# Patient Record
Sex: MALE | Race: WHITE | URBAN - METROPOLITAN AREA
[De-identification: names, ages, dates, MRNs, and addresses within clinical notes are randomized per-mention and may not be internally consistent; named-entity substitution may affect disease eponyms.]

---

## 2017-05-17 ENCOUNTER — HOSPITAL ENCOUNTER (OUTPATIENT)
Facility: CLINIC | Age: 77
End: 2017-05-17
Attending: OPHTHALMOLOGY | Admitting: OPHTHALMOLOGY
Payer: MEDICARE

## 2017-07-07 RX ORDER — DICLOFENAC SODIUM 1 MG/ML
1 SOLUTION/ DROPS OPHTHALMIC
Status: CANCELLED | OUTPATIENT
Start: 2017-07-07

## 2017-07-07 RX ORDER — PHENYLEPHRINE HYDROCHLORIDE 25 MG/ML
1 SOLUTION/ DROPS OPHTHALMIC
Status: CANCELLED | OUTPATIENT
Start: 2017-07-07

## 2017-07-07 RX ORDER — MOXIFLOXACIN 5 MG/ML
1 SOLUTION/ DROPS OPHTHALMIC
Status: CANCELLED | OUTPATIENT
Start: 2017-07-07

## 2017-07-07 RX ORDER — TROPICAMIDE 10 MG/ML
1 SOLUTION/ DROPS OPHTHALMIC
Status: CANCELLED | OUTPATIENT
Start: 2017-07-07

## 2017-07-17 RX ORDER — METRONIDAZOLE 10 MG/G
GEL TOPICAL DAILY
COMMUNITY

## 2017-07-17 RX ORDER — DOXYCYCLINE 50 MG/1
CAPSULE ORAL
COMMUNITY

## 2017-07-17 RX ORDER — TRETINOIN 0.25 MG/G
CREAM TOPICAL AT BEDTIME
COMMUNITY

## 2017-07-17 RX ORDER — FLUOROURACIL 50 MG/G
CREAM TOPICAL
Status: ON HOLD | COMMUNITY
End: 2017-08-15

## 2017-07-18 ENCOUNTER — SURGERY (OUTPATIENT)
Age: 77
End: 2017-07-18

## 2017-07-18 ENCOUNTER — ANESTHESIA (OUTPATIENT)
Dept: SURGERY | Facility: CLINIC | Age: 77
End: 2017-07-18
Payer: MEDICARE

## 2017-07-18 ENCOUNTER — ANESTHESIA EVENT (OUTPATIENT)
Dept: SURGERY | Facility: CLINIC | Age: 77
End: 2017-07-18
Payer: MEDICARE

## 2017-07-18 ENCOUNTER — HOSPITAL ENCOUNTER (OUTPATIENT)
Facility: CLINIC | Age: 77
Discharge: HOME | End: 2017-07-18
Attending: OPHTHALMOLOGY | Admitting: OPHTHALMOLOGY
Payer: MEDICARE

## 2017-07-18 VITALS
OXYGEN SATURATION: 97 % | DIASTOLIC BLOOD PRESSURE: 73 MMHG | RESPIRATION RATE: 16 BRPM | WEIGHT: 199 LBS | HEIGHT: 71 IN | BODY MASS INDEX: 27.86 KG/M2 | SYSTOLIC BLOOD PRESSURE: 153 MMHG | TEMPERATURE: 96.9 F

## 2017-07-18 PROCEDURE — 40000170 ZZH STATISTIC PRE-PROCEDURE ASSESSMENT II: Performed by: OPHTHALMOLOGY

## 2017-07-18 PROCEDURE — 37000009 ZZH ANESTHESIA TECHNICAL FEE, EACH ADDTL 15 MIN: Performed by: OPHTHALMOLOGY

## 2017-07-18 PROCEDURE — 25000125 ZZHC RX 250: Performed by: OPHTHALMOLOGY

## 2017-07-18 PROCEDURE — 36000101 ZZH EYE SURGERY LEVEL 3 1ST 30 MIN: Performed by: OPHTHALMOLOGY

## 2017-07-18 PROCEDURE — 25000125 ZZHC RX 250: Performed by: ANESTHESIOLOGY

## 2017-07-18 PROCEDURE — 36000135 ZZH KERATOTOMY ARCUATE W FEMTOSECOND LASER/IMAGING FOR ATIOL: Performed by: OPHTHALMOLOGY

## 2017-07-18 PROCEDURE — V2788 PRESBYOPIA-CORRECT FUNCTION: HCPCS | Performed by: OPHTHALMOLOGY

## 2017-07-18 PROCEDURE — 27210794 ZZH OR GENERAL SUPPLY STERILE: Performed by: OPHTHALMOLOGY

## 2017-07-18 PROCEDURE — 25000128 H RX IP 250 OP 636: Performed by: NURSE ANESTHETIST, CERTIFIED REGISTERED

## 2017-07-18 PROCEDURE — 25000128 H RX IP 250 OP 636: Performed by: OPHTHALMOLOGY

## 2017-07-18 PROCEDURE — 37000008 ZZH ANESTHESIA TECHNICAL FEE, 1ST 30 MIN: Performed by: OPHTHALMOLOGY

## 2017-07-18 PROCEDURE — 71000028 ZZH EYE RECOVERY PHASE 2 EACH 15 MINS: Performed by: OPHTHALMOLOGY

## 2017-07-18 PROCEDURE — V2632 POST CHMBR INTRAOCULAR LENS: HCPCS | Performed by: OPHTHALMOLOGY

## 2017-07-18 PROCEDURE — 25000128 H RX IP 250 OP 636: Performed by: ANESTHESIOLOGY

## 2017-07-18 RX ORDER — BALANCED SALT SOLUTION 6.4; .75; .48; .3; 3.9; 1.7 MG/ML; MG/ML; MG/ML; MG/ML; MG/ML; MG/ML
SOLUTION OPHTHALMIC PRN
Status: DISCONTINUED | OUTPATIENT
Start: 2017-07-18 | End: 2017-07-18 | Stop reason: HOSPADM

## 2017-07-18 RX ORDER — TROPICAMIDE 10 MG/ML
1 SOLUTION/ DROPS OPHTHALMIC
Status: COMPLETED | OUTPATIENT
Start: 2017-07-18 | End: 2017-07-18

## 2017-07-18 RX ORDER — PHENYLEPHRINE HYDROCHLORIDE 25 MG/ML
1 SOLUTION/ DROPS OPHTHALMIC
Status: COMPLETED | OUTPATIENT
Start: 2017-07-18 | End: 2017-07-18

## 2017-07-18 RX ORDER — ONDANSETRON 2 MG/ML
INJECTION INTRAMUSCULAR; INTRAVENOUS PRN
Status: DISCONTINUED | OUTPATIENT
Start: 2017-07-18 | End: 2017-07-18

## 2017-07-18 RX ORDER — LIDOCAINE HYDROCHLORIDE 10 MG/ML
INJECTION, SOLUTION EPIDURAL; INFILTRATION; INTRACAUDAL; PERINEURAL PRN
Status: DISCONTINUED | OUTPATIENT
Start: 2017-07-18 | End: 2017-07-18 | Stop reason: HOSPADM

## 2017-07-18 RX ORDER — MOXIFLOXACIN 5 MG/ML
1 SOLUTION/ DROPS OPHTHALMIC
Status: COMPLETED | OUTPATIENT
Start: 2017-07-18 | End: 2017-07-18

## 2017-07-18 RX ORDER — DICLOFENAC SODIUM 1 MG/ML
1 SOLUTION/ DROPS OPHTHALMIC
Status: COMPLETED | OUTPATIENT
Start: 2017-07-18 | End: 2017-07-18

## 2017-07-18 RX ORDER — PREDNISOLONE ACETATE 1 %
SUSPENSION, DROPS(FINAL DOSAGE FORM)(ML) OPHTHALMIC (EYE) PRN
Status: DISCONTINUED | OUTPATIENT
Start: 2017-07-18 | End: 2017-07-18 | Stop reason: HOSPADM

## 2017-07-18 RX ORDER — SODIUM CHLORIDE, SODIUM LACTATE, POTASSIUM CHLORIDE, CALCIUM CHLORIDE 600; 310; 30; 20 MG/100ML; MG/100ML; MG/100ML; MG/100ML
500 INJECTION, SOLUTION INTRAVENOUS CONTINUOUS
Status: DISCONTINUED | OUTPATIENT
Start: 2017-07-18 | End: 2017-07-18 | Stop reason: HOSPADM

## 2017-07-18 RX ORDER — PROPARACAINE HYDROCHLORIDE 5 MG/ML
SOLUTION/ DROPS OPHTHALMIC PRN
Status: DISCONTINUED | OUTPATIENT
Start: 2017-07-18 | End: 2017-07-18 | Stop reason: HOSPADM

## 2017-07-18 RX ADMIN — PHENYLEPHRINE HYDROCHLORIDE 1 DROP: 2.5 SOLUTION/ DROPS OPHTHALMIC at 08:39

## 2017-07-18 RX ADMIN — MOXIFLOXACIN HYDROCHLORIDE 1 DROP: 5 SOLUTION/ DROPS OPHTHALMIC at 08:46

## 2017-07-18 RX ADMIN — PHENYLEPHRINE HYDROCHLORIDE 1 DROP: 2.5 SOLUTION/ DROPS OPHTHALMIC at 08:46

## 2017-07-18 RX ADMIN — Medication 1 DROP: at 10:23

## 2017-07-18 RX ADMIN — PROPARACAINE HYDROCHLORIDE 2 DROP: 5 SOLUTION/ DROPS OPHTHALMIC at 09:54

## 2017-07-18 RX ADMIN — MOXIFLOXACIN HYDROCHLORIDE 1 DROP: 5 SOLUTION/ DROPS OPHTHALMIC at 08:33

## 2017-07-18 RX ADMIN — TROPICAMIDE 1 DROP: 10 SOLUTION/ DROPS OPHTHALMIC at 08:46

## 2017-07-18 RX ADMIN — PHENYLEPHRINE HYDROCHLORIDE 1 DROP: 2.5 SOLUTION/ DROPS OPHTHALMIC at 08:33

## 2017-07-18 RX ADMIN — DICLOFENAC SODIUM 1 DROP: 1 SOLUTION/ DROPS OPHTHALMIC at 08:33

## 2017-07-18 RX ADMIN — LIDOCAINE HYDROCHLORIDE 1 ML: 10 INJECTION, SOLUTION EPIDURAL; INFILTRATION; INTRACAUDAL; PERINEURAL at 08:46

## 2017-07-18 RX ADMIN — SODIUM CHONDROITIN SULFATE / SODIUM HYALURONATE 1 ML: 0.55-0.5 INJECTION INTRAOCULAR at 10:16

## 2017-07-18 RX ADMIN — BALANCED SALT SOLUTION 15 ML: 6.4; .75; .48; .3; 3.9; 1.7 SOLUTION OPHTHALMIC at 09:54

## 2017-07-18 RX ADMIN — EPINEPHRINE 500 ML: 1 INJECTION, SOLUTION, CONCENTRATE INTRAVENOUS at 10:12

## 2017-07-18 RX ADMIN — ONDANSETRON 4 MG: 2 INJECTION INTRAMUSCULAR; INTRAVENOUS at 10:01

## 2017-07-18 RX ADMIN — SODIUM CHLORIDE, SODIUM LACTATE, POTASSIUM CHLORIDE, CALCIUM CHLORIDE: 600; 310; 30; 20 INJECTION, SOLUTION INTRAVENOUS at 10:01

## 2017-07-18 RX ADMIN — LIDOCAINE HYDROCHLORIDE 1 ML: 10 INJECTION, SOLUTION EPIDURAL; INFILTRATION; INTRACAUDAL; PERINEURAL at 10:16

## 2017-07-18 RX ADMIN — TROPICAMIDE 1 DROP: 10 SOLUTION/ DROPS OPHTHALMIC at 08:33

## 2017-07-18 RX ADMIN — BALANCED SALT SOLUTION 15 ML: 6.4; .75; .48; .3; 3.9; 1.7 SOLUTION OPHTHALMIC at 10:12

## 2017-07-18 RX ADMIN — DICLOFENAC SODIUM 1 DROP: 1 SOLUTION/ DROPS OPHTHALMIC at 08:39

## 2017-07-18 RX ADMIN — DICLOFENAC SODIUM 1 DROP: 1 SOLUTION/ DROPS OPHTHALMIC at 08:46

## 2017-07-18 RX ADMIN — MOXIFLOXACIN HYDROCHLORIDE 1 DROP: 5 SOLUTION/ DROPS OPHTHALMIC at 08:39

## 2017-07-18 RX ADMIN — MIDAZOLAM HYDROCHLORIDE 1 MG: 1 INJECTION, SOLUTION INTRAMUSCULAR; INTRAVENOUS at 10:01

## 2017-07-18 RX ADMIN — TROPICAMIDE 1 DROP: 10 SOLUTION/ DROPS OPHTHALMIC at 08:39

## 2017-07-18 NOTE — IP AVS SNAPSHOT
MRN:[id number]                      After Visit Summary   7/18/2017    Conrad Giron    MRN: [id number]           Thank you!     Thank you for choosing [facility] for your care. Our goal is always to provide you with excellent care. Hearing back from our patients is one way we can continue to improve our services. Please take a few minutes to complete the written survey that you may receive in the mail after you visit with us. Thank you!        Patient Information     Date Of Birth          1940        About your hospital stay     You were admitted on:  July 18, 2017 You last received care in the:  [facility]    You were discharged on:  July 18, 2017       Who to Call     For medical emergencies, please call 911.  For non-urgent questions about your medical care, please call your primary care provider or clinic, [phone]  For questions related to your surgery, please call your surgery clinic        Attending Provider     Provider Specialty    Sanya Mancuso MD Ophthalmology       Primary Care Provider Office Phone # Fax #    Ja Nichole [phone]      Your next 10 appointments already scheduled     Aug 15, 2017   Procedure with Sanya Mancuso MD   [facility] (--)    [address]  [facility] [id number] [phone]            Aug 15, 2017   Procedure with Sanya Mancuso MD   [facility] (--)    [address]  [place] [id number] [phone]              Further instructions from your care team       [facility] Discharge  Instructions  Anesthesia ([facility] [place])   Adult Discharge Instructions    For 24 hours after surgery    1. Get plenty of rest.  Make arrangements to have a responsible adult stay with you for at least 6 hours after you leave the hospital.  2. Do not drive or use heavy equipment for 24 hours.    3. Do  not drink alcohol for 24 hours.  4. Do not sign legal documents or make important decisions for 24 hours.  5. Avoid strenuous or risky activities. You may feel lightheaded.  If so, sit for a few minutes before standing.  Have someone help you get up.   6. Conscious sedation patients may resume a regular diet..  7. Any questions of medical nature, call your physician.    [facility]  Cataract Surgery Discharge Instructions  [facility] MD RYAN Painting MD J. Hasan, MD C. Nichols, MD J. O'Neill, MD S. Schaefer, MD J. Stephens, MD        Start using drops when you arrive at home today    Vigamox or Ofloxacin (Tan top) - one drop in surgical eye 3 times  per day until gone.    Prednisolone acetate 1.0% (pink or white top) - one drop in surgical eye 3 times per day until gone.    Ketorolac or Diclofenac (Grey top) - one drop in surgical eye 3 times per day until gone.       OR     You may have been prescribed SmartDrops, which is a compound formula drop that combines all three medications in a single drop. SmartDrops should be instilled to the surgical eye 3 times daily until gone.        Place shield over surgical eye at bedtime for 3 nights.      The eye will feel itchy, scratchy, and vision will be blurred, you may take Tylenol for the scratchy feeling if this is bothersome.      No eye rubbing or swimming for I week.      You may resume all prescription medications as directed by your primary doctor.      Call if increasing pain, progressively worsening vision or worsening redness of surgical eye.      On-call doctor can be reached at [phone].    Pending Results     No orders found from 7/16/2017 to 7/19/2017.            Admission Information     Date & Time Provider Department Dept. Phone    7/18/2017 Sanya Mancuso MD [facility] [phone]      Your Vitals Were     Blood Pressure Temperature  "Respirations Height Weight Pulse Oximetry    173/74 96.9  F (36.1  C) (Temporal) 16 1.803 m (5' 10.98\") 90.3 kg (199 lb) 97%    BMI (Body Mass Index)                   27.77 kg/m2           [facility] Information     [facility] lets you send messages to your doctor, view your test results, renew your prescriptions, schedule appointments and more. To sign up, go to [email or web address] . Click on \"Log in\" on the left side of the screen, which will take you to the Welcome page. Then click on \"Sign up Now\" on the right side of the page.     You will be asked to enter the access code listed below, as well as some personal information. Please follow the directions to create your username and password.     Your access code is: [email or web address]  Expires: 10/16/2017 10:27 AM     Your access code will  in 90 days. If you need help or a new code, please call your [facility] clinic or [phone].        Care EveryWhere ID     This is your Care EveryWhere ID. This could be used by other organizations to access your [facility] medical records  [id number]        Equal Access to Services     DENIA BAKER AH: Arlene Vasquez, warosalinada candice, qaybta kaalmada adegumaroda, dontrell vergara. So [facility] [phone].    ATENCIÓN: Si habla español, tiene a briscoe disposición servicios gratuitos de asistencia lingüística. Llame al [phone].    We comply with applicable federal civil rights laws and [place] laws. We do not discriminate on the basis of race, color, national origin, age, disability sex, sexual orientation or gender identity.               Review of your medicines      UNREVIEWED medicines. Ask your doctor about these medicines        Dose / Directions    doxycycline Monohydrate 50 MG Caps capsule        One twice a day . Wean as tolerated   Refills:  0       FIBER PO        3.4 g by mouth 1-2 times perday   Refills:  0       fluorouracil 5 % cream   Commonly known as:  EFUDEX        Apply to " lesions back of head nightly for 30 days   Refills:  0       metroNIDAZOLE 1 % gel   Commonly known as:  METROGEL        Apply topically daily   Refills:  0       tretinoin 0.025 % cream   Commonly known as:  RETIN-A        Apply topically At Bedtime   Refills:  0       VITAMIN D-3 PO        Dose:  2000 Units   Take 2,000 Units by mouth daily 2 tablets daily   Refills:  0                Protect others around you: Learn how to safely use, store and throw away your medicines at www.disposemymeds.org.             Medication List: This is a list of all your medications and when to take them. Check marks below indicate your daily home schedule. Keep this list as a reference.      Medications           Morning Afternoon Evening Bedtime As Needed    doxycycline Monohydrate 50 MG Caps capsule   One twice a day . Wean as tolerated                                FIBER PO   3.4 g by mouth 1-2 times perday                                fluorouracil 5 % cream   Commonly known as:  EFUDEX   Apply to lesions back of head nightly for 30 days                                metroNIDAZOLE 1 % gel   Commonly known as:  METROGEL   Apply topically daily                                tretinoin 0.025 % cream   Commonly known as:  RETIN-A   Apply topically At Bedtime                                VITAMIN D-3 PO   Take 2,000 Units by mouth daily 2 tablets daily

## 2017-07-18 NOTE — ANESTHESIA POSTPROCEDURE EVALUATION
Patient: Conrad SINCLAIR Case    Procedure(s):  LEFT EYE FEMTOSECOND LASER ASSISTED PHACOEMULSIFICATION CLEAR CORNEA WITH DELUXE MULTIFOCAL INTRAOCULAR LENS IMPLANT - Wound Class: I-Clean    Diagnosis:LEFT EYE CATARACT  Diagnosis Additional Information: No value filed.    Anesthesia Type:  MAC    Note:  Anesthesia Post Evaluation    Patient location during evaluation: PACU  Patient participation: Able to fully participate in evaluation  Level of consciousness: awake  Pain management: adequate  Airway patency: patent  Cardiovascular status: acceptable  Respiratory status: acceptable  Hydration status: acceptable  PONV: controlled     Anesthetic complications: None          Last vitals:  Vitals:    07/18/17 0835 07/18/17 1029 07/18/17 1045   BP: 173/74 158/76 153/73   Resp: 16 16 16   Temp: 36.1  C (96.9  F)     SpO2: 97% 97% 97%         Electronically Signed By: Erasmo Hernandez MD  July 18, 2017  4:47 PM

## 2017-07-18 NOTE — ANESTHESIA PREPROCEDURE EVALUATION
Anesthesia Evaluation     . Pt has had prior anesthetic.     No history of anesthetic complications          ROS/MED HX    ENT/Pulmonary:      (-) sleep apnea   Neurologic:       Cardiovascular:         METS/Exercise Tolerance:     Hematologic:         Musculoskeletal:         GI/Hepatic:         Renal/Genitourinary:         Endo:         Psychiatric:         Infectious Disease:         Malignancy:         Other: Comment: bph  Skin ca                    Physical Exam  Normal systems: dental    Airway   Mallampati: II  TM distance: >3 FB  Neck ROM: full    Dental     Cardiovascular   Rhythm and rate: regular      Pulmonary    breath sounds clear to auscultation                    Anesthesia Plan      History & Physical Review  History and physical reviewed and following examination; no interval change.    ASA Status:  2 .        Plan for MAC with Intravenous induction. Reason for MAC:  Procedure to face, neck, head or breast         Postoperative Care      Consents  Anesthetic plan, risks, benefits and alternatives discussed with:  Patient..                          .

## 2017-07-18 NOTE — IP AVS SNAPSHOT
[facility]    [address]    [place] [address]    Phone:  [phone]                                       After Visit Summary   7/18/2017    Conrad Giron    MRN: [id number]           After Visit Summary Signature Page     I have received my discharge instructions, and my questions have been answered. I have discussed any challenges I see with this plan with the nurse or doctor.    ..........................................................................................................................................  Patient/Patient Representative Signature      ..........................................................................................................................................  Patient Representative Print Name and Relationship to Patient    ..................................................               ................................................  Date                                            Time    ..........................................................................................................................................  Reviewed by Signature/Title    ...................................................              ..............................................  Date                                                            Time

## 2017-07-18 NOTE — DISCHARGE INSTRUCTIONS
[facility]est[facility] Discharge  Instructions  Anesthesia ([facility])   Adult Discharge Instructions    For 24 hours after surgery    1. Get plenty of rest.  Make arrangements to have a responsible adult stay with you for at least 6 hours after you leave the hospital.  2. Do not drive or use heavy equipment for 24 hours.    3. Do not drink alcohol for 24 hours.  4. Do not sign legal documents or make important decisions for 24 hours.  5. Avoid strenuous or risky activities. You may feel lightheaded.  If so, sit for a few minutes before standing.  Have someone help you get up.   6. Conscious sedation patients may resume a regular diet..  7. Any questions of medical nature, call your physician.    [facility]  Cataract Surgery Discharge Instructions  [facility] MD RYAN Painting MD J. Hasan, MD C. Nichols, MD J. O'Neill, MD S. Schaefer, MD J. Stephens, MD        Start using drops when you arrive at home today    Vigamox or Ofloxacin (Tan top) - one drop in surgical eye 3 times  per day until gone.    Prednisolone acetate 1.0% (pink or white top) - one drop in surgical eye 3 times per day until gone.    Ketorolac or Diclofenac (Grey top) - one drop in surgical eye 3 times per day until gone.       OR     You may have been prescribed SmartDrops, which is a compound formula drop that combines all three medications in a single drop. SmartDrops should be instilled to the surgical eye 3 times daily until gone.        Place shield over surgical eye at bedtime for 3 nights.      The eye will feel itchy, scratchy, and vision will be blurred, you may take Tylenol for the scratchy feeling if this is bothersome.      No eye rubbing or swimming for I week.      You may resume all prescription medications as directed by your primary doctor.      Call if increasing pain, progressively worsening vision or  worsening redness of surgical eye.      On-call doctor can be reached at [phone].

## 2017-07-18 NOTE — OP NOTE
[facility]  Ophthalmology Operative Note    PREOPERATIVE DIAGNOSIS: Cataract, Left eye.   POSTOPERATIVE DIAGNOSIS: Cataract, Left eye.   OPERATION: Cataract extraction with placement of posterior chamber intraocular lens in the Left eye.   ANESTHESIA: Topical   INDICATIONS FOR PROCEDURE: Conrad Giron was seen in the [facility] for decreased visual acuity in the Left eye. The patient was found to have a cataract in the Left eye. The risks, benefits, alternatives and goals of cataract extraction were discussed with the patient, and after adequate discussion the patient understood and agreed to these, and a signed informed consent was obtained prior to the procedure.   DESCRIPTION OF PROCEDURE: After proper patient identification, topical anesthesia was applied to the Left eye. The patient was brought to the operating room and the Left eye was prepped and draped in the usual manner for intraocular surgery. A lid speculum was placed in the Left eye. A paracentesis was then created and the anterior chamber was filled with 1% non-preserved lidocaine followed by Viscoat. A clear corneal incision was then created, tunneling 2 mm into the cornea before entering the anterior chamber. A continuous curvilinear capsulorrhexis was then created using a cystotome and Utrata forceps. Hydrodissection was carried out with BSS on a blunt-tip cannula and the lens rotated freely within the capsular bag. Phacoemulsification was then carried out using the stop and chop technique. Residual cortical material was removed using the I&A handpiece. The capsular bag was then filled with Provisc and an injectable lens was injected into the capsular bag. The lens showed good centration and stability. Residual viscoelastic was removed using the I&A handpiece. The wound was then hydrated and the anterior chamber reformed through the paracentesis. The wound was checked and found to be sealed. Topical  drops of Vigamox and a Econopred were placed in the patient's Left eye followed by a Alvarez shield over the top of this. The patient tolerated the procedure well without complications and was told to follow up in the clinic in the next postoperative day.       Implant Name Type Inv. Item Serial No.  Lot No. LRB No. Used   EYE IMP IOL KRYSTYNA 1-PIECE TECNIS TORIC +20.5 [id number]     [id number]     Left 1        ROSHNI BALLARD MD

## 2017-08-07 RX ORDER — DICLOFENAC SODIUM 1 MG/ML
1 SOLUTION/ DROPS OPHTHALMIC
Status: CANCELLED | OUTPATIENT
Start: 2017-08-07

## 2017-08-07 RX ORDER — MOXIFLOXACIN 5 MG/ML
1 SOLUTION/ DROPS OPHTHALMIC
Status: CANCELLED | OUTPATIENT
Start: 2017-08-07

## 2017-08-07 RX ORDER — PHENYLEPHRINE HYDROCHLORIDE 25 MG/ML
1 SOLUTION/ DROPS OPHTHALMIC
Status: CANCELLED | OUTPATIENT
Start: 2017-08-07

## 2017-08-07 RX ORDER — TROPICAMIDE 10 MG/ML
1 SOLUTION/ DROPS OPHTHALMIC
Status: CANCELLED | OUTPATIENT
Start: 2017-08-07

## 2017-08-15 ENCOUNTER — HOSPITAL ENCOUNTER (OUTPATIENT)
Facility: CLINIC | Age: 77
Discharge: HOME | End: 2017-08-15
Attending: OPHTHALMOLOGY | Admitting: OPHTHALMOLOGY
Payer: MEDICARE

## 2017-08-15 ENCOUNTER — ANESTHESIA EVENT (OUTPATIENT)
Dept: SURGERY | Facility: CLINIC | Age: 77
End: 2017-08-15
Payer: MEDICARE

## 2017-08-15 ENCOUNTER — APPOINTMENT (OUTPATIENT)
Dept: ADMISSION | Facility: CLINIC | Age: 77
End: 2017-08-15
Attending: OPHTHALMOLOGY
Payer: COMMERCIAL

## 2017-08-15 ENCOUNTER — SURGERY (OUTPATIENT)
Age: 77
End: 2017-08-15

## 2017-08-15 ENCOUNTER — ANESTHESIA (OUTPATIENT)
Dept: SURGERY | Facility: CLINIC | Age: 77
End: 2017-08-15
Payer: MEDICARE

## 2017-08-15 VITALS
BODY MASS INDEX: 27.3 KG/M2 | TEMPERATURE: 96.8 F | WEIGHT: 195 LBS | DIASTOLIC BLOOD PRESSURE: 80 MMHG | SYSTOLIC BLOOD PRESSURE: 139 MMHG | RESPIRATION RATE: 16 BRPM | HEIGHT: 71 IN | OXYGEN SATURATION: 96 % | HEART RATE: 46 BPM

## 2017-08-15 PROCEDURE — 27210794 ZZH OR GENERAL SUPPLY STERILE: Performed by: OPHTHALMOLOGY

## 2017-08-15 PROCEDURE — V2632 POST CHMBR INTRAOCULAR LENS: HCPCS | Performed by: OPHTHALMOLOGY

## 2017-08-15 PROCEDURE — 25000125 ZZHC RX 250: Performed by: NURSE ANESTHETIST, CERTIFIED REGISTERED

## 2017-08-15 PROCEDURE — V2788 PRESBYOPIA-CORRECT FUNCTION: HCPCS | Performed by: OPHTHALMOLOGY

## 2017-08-15 PROCEDURE — 40000170 ZZH STATISTIC PRE-PROCEDURE ASSESSMENT II: Performed by: OPHTHALMOLOGY

## 2017-08-15 PROCEDURE — 36000101 ZZH EYE SURGERY LEVEL 3 1ST 30 MIN: Performed by: OPHTHALMOLOGY

## 2017-08-15 PROCEDURE — 71000028 ZZH EYE RECOVERY PHASE 2 EACH 15 MINS: Performed by: OPHTHALMOLOGY

## 2017-08-15 PROCEDURE — 37000008 ZZH ANESTHESIA TECHNICAL FEE, 1ST 30 MIN: Performed by: OPHTHALMOLOGY

## 2017-08-15 PROCEDURE — 36000135 ZZH KERATOTOMY ARCUATE W FEMTOSECOND LASER/IMAGING FOR ATIOL: Performed by: OPHTHALMOLOGY

## 2017-08-15 PROCEDURE — 25000125 ZZHC RX 250: Performed by: OPHTHALMOLOGY

## 2017-08-15 PROCEDURE — 25000128 H RX IP 250 OP 636: Performed by: OPHTHALMOLOGY

## 2017-08-15 PROCEDURE — 25000125 ZZHC RX 250: Performed by: ANESTHESIOLOGY

## 2017-08-15 PROCEDURE — 25000128 H RX IP 250 OP 636: Performed by: NURSE ANESTHETIST, CERTIFIED REGISTERED

## 2017-08-15 DEVICE — IMPLANTABLE DEVICE: Type: IMPLANTABLE DEVICE | Site: EYE | Status: FUNCTIONAL

## 2017-08-15 RX ORDER — NALOXONE HYDROCHLORIDE 0.4 MG/ML
.1-.4 INJECTION, SOLUTION INTRAMUSCULAR; INTRAVENOUS; SUBCUTANEOUS
Status: DISCONTINUED | OUTPATIENT
Start: 2017-08-15 | End: 2017-08-15 | Stop reason: HOSPADM

## 2017-08-15 RX ORDER — ONDANSETRON 2 MG/ML
INJECTION INTRAMUSCULAR; INTRAVENOUS PRN
Status: DISCONTINUED | OUTPATIENT
Start: 2017-08-15 | End: 2017-08-15

## 2017-08-15 RX ORDER — HYDRALAZINE HYDROCHLORIDE 20 MG/ML
2.5-5 INJECTION INTRAMUSCULAR; INTRAVENOUS EVERY 10 MIN PRN
Status: DISCONTINUED | OUTPATIENT
Start: 2017-08-15 | End: 2017-08-15 | Stop reason: HOSPADM

## 2017-08-15 RX ORDER — SODIUM CHLORIDE, SODIUM LACTATE, POTASSIUM CHLORIDE, CALCIUM CHLORIDE 600; 310; 30; 20 MG/100ML; MG/100ML; MG/100ML; MG/100ML
INJECTION, SOLUTION INTRAVENOUS CONTINUOUS PRN
Status: DISCONTINUED | OUTPATIENT
Start: 2017-08-15 | End: 2017-08-15

## 2017-08-15 RX ORDER — PHENYLEPHRINE HYDROCHLORIDE 25 MG/ML
1 SOLUTION/ DROPS OPHTHALMIC
Status: COMPLETED | OUTPATIENT
Start: 2017-08-15 | End: 2017-08-15

## 2017-08-15 RX ORDER — ALBUTEROL SULFATE 0.83 MG/ML
2.5 SOLUTION RESPIRATORY (INHALATION) EVERY 4 HOURS PRN
Status: DISCONTINUED | OUTPATIENT
Start: 2017-08-15 | End: 2017-08-15 | Stop reason: HOSPADM

## 2017-08-15 RX ORDER — BALANCED SALT SOLUTION 6.4; .75; .48; .3; 3.9; 1.7 MG/ML; MG/ML; MG/ML; MG/ML; MG/ML; MG/ML
SOLUTION OPHTHALMIC PRN
Status: DISCONTINUED | OUTPATIENT
Start: 2017-08-15 | End: 2017-08-15 | Stop reason: HOSPADM

## 2017-08-15 RX ORDER — DICLOFENAC SODIUM 1 MG/ML
1 SOLUTION/ DROPS OPHTHALMIC
Status: COMPLETED | OUTPATIENT
Start: 2017-08-15 | End: 2017-08-15

## 2017-08-15 RX ORDER — ONDANSETRON 2 MG/ML
4 INJECTION INTRAMUSCULAR; INTRAVENOUS EVERY 30 MIN PRN
Status: DISCONTINUED | OUTPATIENT
Start: 2017-08-15 | End: 2017-08-15 | Stop reason: HOSPADM

## 2017-08-15 RX ORDER — PREDNISOLONE ACETATE 1 %
SUSPENSION, DROPS(FINAL DOSAGE FORM)(ML) OPHTHALMIC (EYE) PRN
Status: DISCONTINUED | OUTPATIENT
Start: 2017-08-15 | End: 2017-08-15 | Stop reason: HOSPADM

## 2017-08-15 RX ORDER — LABETALOL HYDROCHLORIDE 5 MG/ML
10 INJECTION, SOLUTION INTRAVENOUS
Status: DISCONTINUED | OUTPATIENT
Start: 2017-08-15 | End: 2017-08-15 | Stop reason: HOSPADM

## 2017-08-15 RX ORDER — TROPICAMIDE 10 MG/ML
1 SOLUTION/ DROPS OPHTHALMIC
Status: COMPLETED | OUTPATIENT
Start: 2017-08-15 | End: 2017-08-15

## 2017-08-15 RX ORDER — FENTANYL CITRATE 50 UG/ML
25-50 INJECTION, SOLUTION INTRAMUSCULAR; INTRAVENOUS
Status: DISCONTINUED | OUTPATIENT
Start: 2017-08-15 | End: 2017-08-15 | Stop reason: HOSPADM

## 2017-08-15 RX ORDER — SODIUM CHLORIDE, SODIUM LACTATE, POTASSIUM CHLORIDE, CALCIUM CHLORIDE 600; 310; 30; 20 MG/100ML; MG/100ML; MG/100ML; MG/100ML
INJECTION, SOLUTION INTRAVENOUS CONTINUOUS
Status: DISCONTINUED | OUTPATIENT
Start: 2017-08-15 | End: 2017-08-15 | Stop reason: HOSPADM

## 2017-08-15 RX ORDER — PROPARACAINE HYDROCHLORIDE 5 MG/ML
SOLUTION/ DROPS OPHTHALMIC PRN
Status: DISCONTINUED | OUTPATIENT
Start: 2017-08-15 | End: 2017-08-15 | Stop reason: HOSPADM

## 2017-08-15 RX ORDER — ONDANSETRON 4 MG/1
4 TABLET, ORALLY DISINTEGRATING ORAL EVERY 30 MIN PRN
Status: DISCONTINUED | OUTPATIENT
Start: 2017-08-15 | End: 2017-08-15 | Stop reason: HOSPADM

## 2017-08-15 RX ORDER — MEPERIDINE HYDROCHLORIDE 25 MG/ML
12.5 INJECTION INTRAMUSCULAR; INTRAVENOUS; SUBCUTANEOUS
Status: DISCONTINUED | OUTPATIENT
Start: 2017-08-15 | End: 2017-08-15 | Stop reason: HOSPADM

## 2017-08-15 RX ORDER — LIDOCAINE HYDROCHLORIDE 10 MG/ML
INJECTION, SOLUTION EPIDURAL; INFILTRATION; INTRACAUDAL; PERINEURAL PRN
Status: DISCONTINUED | OUTPATIENT
Start: 2017-08-15 | End: 2017-08-15 | Stop reason: HOSPADM

## 2017-08-15 RX ORDER — MOXIFLOXACIN 5 MG/ML
1 SOLUTION/ DROPS OPHTHALMIC
Status: COMPLETED | OUTPATIENT
Start: 2017-08-15 | End: 2017-08-15

## 2017-08-15 RX ORDER — SODIUM CHLORIDE, SODIUM LACTATE, POTASSIUM CHLORIDE, CALCIUM CHLORIDE 600; 310; 30; 20 MG/100ML; MG/100ML; MG/100ML; MG/100ML
500 INJECTION, SOLUTION INTRAVENOUS CONTINUOUS
Status: DISCONTINUED | OUTPATIENT
Start: 2017-08-15 | End: 2017-08-15 | Stop reason: HOSPADM

## 2017-08-15 RX ADMIN — PHENYLEPHRINE HYDROCHLORIDE 1 DROP: 2.5 SOLUTION/ DROPS OPHTHALMIC at 07:28

## 2017-08-15 RX ADMIN — PHENYLEPHRINE HYDROCHLORIDE 1 DROP: 2.5 SOLUTION/ DROPS OPHTHALMIC at 07:17

## 2017-08-15 RX ADMIN — Medication 1 DROP: at 09:30

## 2017-08-15 RX ADMIN — MIDAZOLAM HYDROCHLORIDE 0.5 MG: 1 INJECTION, SOLUTION INTRAMUSCULAR; INTRAVENOUS at 09:17

## 2017-08-15 RX ADMIN — LIDOCAINE HYDROCHLORIDE 1 ML: 10 INJECTION, SOLUTION EPIDURAL; INFILTRATION; INTRACAUDAL; PERINEURAL at 07:27

## 2017-08-15 RX ADMIN — TROPICAMIDE 1 DROP: 10 SOLUTION/ DROPS OPHTHALMIC at 07:22

## 2017-08-15 RX ADMIN — MOXIFLOXACIN HYDROCHLORIDE 1 DROP: 5 SOLUTION/ DROPS OPHTHALMIC at 07:22

## 2017-08-15 RX ADMIN — SODIUM CHONDROITIN SULFATE / SODIUM HYALURONATE 1 ML: 0.55-0.5 INJECTION INTRAOCULAR at 09:22

## 2017-08-15 RX ADMIN — DICLOFENAC SODIUM 1 DROP: 1 SOLUTION/ DROPS OPHTHALMIC at 07:22

## 2017-08-15 RX ADMIN — TROPICAMIDE 1 DROP: 10 SOLUTION/ DROPS OPHTHALMIC at 07:17

## 2017-08-15 RX ADMIN — ONDANSETRON 4 MG: 2 INJECTION INTRAMUSCULAR; INTRAVENOUS at 09:23

## 2017-08-15 RX ADMIN — DEXMEDETOMIDINE HYDROCHLORIDE 8 MCG: 100 INJECTION, SOLUTION INTRAVENOUS at 09:17

## 2017-08-15 RX ADMIN — BALANCED SALT SOLUTION 15 ML: 6.4; .75; .48; .3; 3.9; 1.7 SOLUTION OPHTHALMIC at 09:07

## 2017-08-15 RX ADMIN — PHENYLEPHRINE HYDROCHLORIDE 1 DROP: 2.5 SOLUTION/ DROPS OPHTHALMIC at 07:22

## 2017-08-15 RX ADMIN — LIDOCAINE HYDROCHLORIDE 1 ML: 10 INJECTION, SOLUTION EPIDURAL; INFILTRATION; INTRACAUDAL; PERINEURAL at 09:22

## 2017-08-15 RX ADMIN — LIDOCAINE HYDROCHLORIDE 0.5 ML: 35 GEL OPHTHALMIC at 09:22

## 2017-08-15 RX ADMIN — PROPARACAINE HYDROCHLORIDE 1 DROP: 5 SOLUTION/ DROPS OPHTHALMIC at 08:48

## 2017-08-15 RX ADMIN — EPINEPHRINE 500 ML: 1 INJECTION, SOLUTION, CONCENTRATE INTRAVENOUS at 09:22

## 2017-08-15 RX ADMIN — MOXIFLOXACIN HYDROCHLORIDE 1 DROP: 5 SOLUTION/ DROPS OPHTHALMIC at 07:28

## 2017-08-15 RX ADMIN — DICLOFENAC SODIUM 1 DROP: 1 SOLUTION/ DROPS OPHTHALMIC at 07:28

## 2017-08-15 RX ADMIN — TROPICAMIDE 1 DROP: 10 SOLUTION/ DROPS OPHTHALMIC at 07:28

## 2017-08-15 RX ADMIN — MOXIFLOXACIN HYDROCHLORIDE 1 DROP: 5 SOLUTION/ DROPS OPHTHALMIC at 07:17

## 2017-08-15 RX ADMIN — DICLOFENAC SODIUM 1 DROP: 1 SOLUTION/ DROPS OPHTHALMIC at 07:17

## 2017-08-15 RX ADMIN — PROPARACAINE HYDROCHLORIDE 1 DROP: 5 SOLUTION/ DROPS OPHTHALMIC at 08:59

## 2017-08-15 RX ADMIN — BALANCED SALT SOLUTION 15 ML: 6.4; .75; .48; .3; 3.9; 1.7 SOLUTION OPHTHALMIC at 09:22

## 2017-08-15 RX ADMIN — MIDAZOLAM HYDROCHLORIDE 1 MG: 1 INJECTION, SOLUTION INTRAMUSCULAR; INTRAVENOUS at 09:15

## 2017-08-15 RX ADMIN — SODIUM CHLORIDE, POTASSIUM CHLORIDE, SODIUM LACTATE AND CALCIUM CHLORIDE: 600; 310; 30; 20 INJECTION, SOLUTION INTRAVENOUS at 09:11

## 2017-08-15 NOTE — IP AVS SNAPSHOT
MRN:[id number]                      After Visit Summary   8/15/2017    Conrad Giron    MRN: [id number]           Thank you!     Thank you for choosing [facility] for your care. Our goal is always to provide you with excellent care. Hearing back from our patients is one way we can continue to improve our services. Please take a few minutes to complete the written survey that you may receive in the mail after you visit with us. Thank you!        Patient Information     Date Of Birth          1940        About your hospital stay     You were admitted on:  August 15, 2017 You last received care in the:  [facility]    You were discharged on:  August 15, 2017       Who to Call     For medical emergencies, please call 911.  For non-urgent questions about your medical care, please call your primary care provider or clinic, [phone]  For questions related to your surgery, please call your surgery clinic        Attending Provider     Provider Specialty    Sanya Mancuso MD Ophthalmology       Primary Care Provider Office Phone # Fax #    Ja ALICIA Nichole [phone]      Further instructions from your care team       [facility]  Cataract Surgery Discharge Instructions  [facility] MD RYAN Painting MD J. Hasan, MD C. Nichols, MD J. O'Neill, MD S. Schaefer, MD J. Stephens, MD        Start using drops when you arrive at home today    MoxOfloxacin (Tan top) - one drop in surgical eye 3 times  per day until gone.    Prednisolone acetate 1.0% (white top) - one drop in surgical eye 3 times per day until gone.    Ketorolac  (Grey top) - one drop in surgical eye 3 times per day until gone.        Place shield over surgical eye at bedtime for 3 nights.      The eye will feel itchy, scratchy, and vision will be blurred, you may take Tylenol for the scratchy feeling if this  "is bothersome.      No eye rubbing or swimming for I week.      You may resume all prescription medications as directed by your primary doctor.      Call if increasing pain, progressively worsening vision or worsening redness of surgical eye.      On-call doctor can be reached at [phone].    [facility] Anesthesia [facility] Care [facility] Discharge  Instructions  Anesthesia ([facility])   Adult Discharge Instructions    For 24 hours after surgery    1. Get plenty of rest.  Make arrangements to have a responsible adult stay with you for at least 6 hours after you leave the hospital.  2. Do not drive or use heavy equipment for 24 hours.    3. Do not drink alcohol for 24 hours.  4. Do not sign legal documents or make important decisions for 24 hours.  5. Avoid strenuous or risky activities. You may feel lightheaded.  If so, sit for a few minutes before standing.  Have someone help you get up.   6. Conscious sedation patients may resume a regular diet..  7. Any questions of medical nature, call your physician.    Pending Results     No orders found from 8/13/2017 to 8/16/2017.            Admission Information     Date & Time Provider Department Dept. Phone    8/15/2017 Sanya Mancuso MD [facility] [place] [phone]      Your Vitals Were     Blood Pressure Pulse Temperature Respirations Height Weight    149/76 46 96.8  F (36  C) (Temporal) 16 1.803 m (5' 10.98\") 88.5 kg (195 lb)    Pulse Oximetry BMI (Body Mass Index)                96% 27.21 kg/m2          [facility] Information     [employer] lets you send messages to your doctor, view your test results, renew your prescriptions, schedule appointments and more. To sign up, go to [email or web address] . Click on \"Log in\" on the left side of the screen, which will take you to the Welcome page. Then click on \"Sign up Now\" on the right side of the page.     You will be asked to enter the access code listed below, as well as some personal " information. Please follow the directions to create your username and password.     Your access code is: [email or web address]  Expires: 10/16/2017 10:27 AM     Your access code will  in 90 days. If you need help or a new code, please call your [facility] clinic or [phone].        Care EveryWhere ID     This is your Care EveryWhere ID. This could be used by other organizations to access your [facility] medical records  [id number]        Equal Access to Services     [facility] HospitalDOTTIE : Hadii aad ku hadasho Soomaali, waaxda luqadaha, qaybta kaalmada adeegyada, waxay adanin hayaan adedenise horn . So [facility] [phone].    ATENCIÓN: Si habla español, tiene a briscoe disposición servicios gratuitos de asistencia lingüística. Llame al [phone].    We comply with applicable federal civil rights laws and [place] laws. We do not discriminate on the basis of race, color, national origin, age, disability sex, sexual orientation or gender identity.               Review of your medicines      UNREVIEWED medicines. Ask your doctor about these medicines        Dose / Directions    doxycycline Monohydrate 50 MG Caps capsule        One twice a day . Wean as tolerated   Refills:  0       FIBER PO        3.4 g by mouth 1-2 times perday   Refills:  0       metroNIDAZOLE 1 % gel   Commonly known as:  METROGEL        Apply topically daily   Refills:  0       tretinoin 0.025 % cream   Commonly known as:  RETIN-A        Apply topically At Bedtime   Refills:  0       VITAMIN D-3 PO        Dose:  2000 Units   Take 2,000 Units by mouth daily 2 tablets daily   Refills:  0                Protect others around you: Learn how to safely use, store and throw away your medicines at www.disposemymeds.org.             Medication List: This is a list of all your medications and when to take them. Check marks below indicate your daily home schedule. Keep this list as a reference.      Medications           Morning Afternoon Evening Bedtime As Needed     doxycycline Monohydrate 50 MG Caps capsule   One twice a day . Wean as tolerated                                FIBER PO   3.4 g by mouth 1-2 times perday                                metroNIDAZOLE 1 % gel   Commonly known as:  METROGEL   Apply topically daily                                tretinoin 0.025 % cream   Commonly known as:  RETIN-A   Apply topically At Bedtime                                VITAMIN D-3 PO   Take 2,000 Units by mouth daily 2 tablets daily

## 2017-08-15 NOTE — DISCHARGE INSTRUCTIONS
[facility]  Cataract Surgery Discharge Instructions  [facility] MD RYAN Painting MD J. Hasan, MD C. Nichols, MD J. O'Neill, MD S. Schaefer, MD J. Stephens, MD        Start using drops when you arrive at home today    MoxOfloxacin (Tan top) - one drop in surgical eye 3 times  per day until gone.    Prednisolone acetate 1.0% (white top) - one drop in surgical eye 3 times per day until gone.    Ketorolac  (Grey top) - one drop in surgical eye 3 times per day until gone.        Place shield over surgical eye at bedtime for 3 nights.      The eye will feel itchy, scratchy, and vision will be blurred, you may take Tylenol for the scratchy feeling if this is bothersome.      No eye rubbing or swimming for I week.      You may resume all prescription medications as directed by your primary doctor.      Call if increasing pain, progressively worsening vision or worsening redness of surgical eye.      On-call doctor can be reached at [phone].    [facility] Anesthesia Eye Care Center Discharge  Instructions  Anesthesia ([facility] Care Center)   Adult Discharge Instructions    For 24 hours after surgery    1. Get plenty of rest.  Make arrangements to have a responsible adult stay with you for at least 6 hours after you leave the hospital.  2. Do not drive or use heavy equipment for 24 hours.    3. Do not drink alcohol for 24 hours.  4. Do not sign legal documents or make important decisions for 24 hours.  5. Avoid strenuous or risky activities. You may feel lightheaded.  If so, sit for a few minutes before standing.  Have someone help you get up.   6. Conscious sedation patients may resume a regular diet..  7. Any questions of medical nature, call your physician.

## 2017-08-15 NOTE — OP NOTE
[facility]  Ophthalmology Operative Note    PREOPERATIVE DIAGNOSIS: Cataract, Right eye.   POSTOPERATIVE DIAGNOSIS: Cataract, Right eye.   OPERATION: Cataract extraction with placement of posterior chamber intraocular lens in the Right eye.   ANESTHESIA: Topical   INDICATIONS FOR PROCEDURE: Conrad Giron was seen in the [facility] for decreased visual acuity in the Right eye. The patient was found to have a cataract in the Right eye. The risks, benefits, alternatives and goals of cataract extraction were discussed with the patient, and after adequate discussion the patient understood and agreed to these, and a signed informed consent was obtained prior to the procedure.   DESCRIPTION OF PROCEDURE: After proper patient identification, topical anesthesia was applied to the Right eye. The patient was brought to the operating room and the Right eye was prepped and draped in the usual manner for intraocular surgery. A lid speculum was placed in the Right eye. A paracentesis was then created and the anterior chamber was filled with 1% non-preserved lidocaine followed by Viscoat. A clear corneal incision was then created, tunneling 2 mm into the cornea before entering the anterior chamber. A continuous curvilinear capsulorrhexis was then created using a cystotome and Utrata forceps. Hydrodissection was carried out with BSS on a blunt-tip cannula and the lens rotated freely within the capsular bag. Phacoemulsification was then carried out using the stop and chop technique. Residual cortical material was removed using the I&A handpiece. The capsular bag was then filled with Provisc and an injectable lens was injected into the capsular bag. The lens showed good centration and stability. Residual viscoelastic was removed using the I&A handpiece. The wound was then hydrated and the anterior chamber reformed through the paracentesis. The wound was checked and found to be sealed.  Topical drops of Vigamox and a Econopred were placed in the patient's Right eye followed by a Alvarez shield over the top of this. The patient tolerated the procedure well without complications and was told to follow up in the clinic in the next postoperative day.       Implant Name Type Inv. Item Serial No.  Lot No. LRB No. Used   EYE IMP IOL KRYSTYNA PCL TECNIS SYMFONY XR [id number] 21.0 Lens/Eye Implant EYE IMP IOL KRYSTYNA PCL TECNIS SYMFONY XR ZXR00 21.0 [id number] [employer]   Right 1        ROSHNI BALLARD MD

## 2017-08-15 NOTE — ANESTHESIA POSTPROCEDURE EVALUATION
Patient: Conrad SINCLAIR Case    Procedure(s):  RIGHT EYE FEMTOSECOND LASER ASSISTED PHACOEMULSIFICATION CLEAR CORNEA WITH DELUXE MULTIFOCAL INTRAOCULAR LENS IMPLANT - Wound Class: I-Clean    Diagnosis:RIGHT EYE CATARACT  Diagnosis Additional Information: No value filed.    Anesthesia Type:  MAC    Note:  Anesthesia Post Evaluation    Patient location during evaluation: PACU  Patient participation: Able to fully participate in evaluation  Level of consciousness: awake  Pain management: adequate  Airway patency: patent  Cardiovascular status: acceptable  Respiratory status: acceptable  Hydration status: acceptable  PONV: none     Anesthetic complications: None          Last vitals:  Vitals:    08/15/17 0724 08/15/17 0932 08/15/17 0959   BP: 149/76 126/72 139/80   Pulse: (!) 46     Resp: 16 16 16   Temp: 36  C (96.8  F)     SpO2: 96% 98% 96%         Electronically Signed By: Neema Asencio MD, MD  August 15, 2017  12:42 PM

## 2017-08-15 NOTE — ANESTHESIA CARE TRANSFER NOTE
Patient: Conrad SINCLAIR Case    Procedure(s):  RIGHT EYE FEMTOSECOND LASER ASSISTED PHACOEMULSIFICATION CLEAR CORNEA WITH DELUXE MULTIFOCAL INTRAOCULAR LENS IMPLANT - Wound Class: I-Clean    Diagnosis: RIGHT EYE CATARACT  Diagnosis Additional Information: No value filed.    Anesthesia Type:   MAC     Note:  Airway :Room Air  Patient [facility] ([facility]  Comments: VSS, spontaneously breathing with sats %.  To PACU, monitors on, report to RN.      Vitals: (Last set prior to Anesthesia Care Transfer)    CRNA VITALS  8/15/2017 0902 - 8/15/2017 0936      8/15/2017             Pulse: (!)  47    Ht Rate: (!)  0    SpO2: 97 %    Resp Rate (set): 10                Electronically Signed By: VITOR Adkins CRNA  August 15, 2017  9:36 AM

## 2017-08-15 NOTE — IP AVS SNAPSHOT
[facility]    [address]    [place] [address]    Phone:  [phone]    Fax:  [phone]                                       After Visit Summary   8/15/2017    Conrad Giron    MRN: [id number]           After Visit Summary Signature Page     I have received my discharge instructions, and my questions have been answered. I have discussed any challenges I see with this plan with the nurse or doctor.    ..........................................................................................................................................  Patient/Patient Representative Signature      ..........................................................................................................................................  Patient Representative Print Name and Relationship to Patient    ..................................................               ................................................  Date                                            Time    ..........................................................................................................................................  Reviewed by Signature/Title    ...................................................              ..............................................  Date                                                            Time

## 2020-07-20 NOTE — ANESTHESIA CARE TRANSFER NOTE
Patient: Conrad SINCLAIR Case    Procedure(s):  LEFT EYE FEMTOSECOND LASER ASSISTED PHACOEMULSIFICATION CLEAR CORNEA WITH DELUXE MULTIFOCAL INTRAOCULAR LENS IMPLANT - Wound Class: I-Clean    Diagnosis: LEFT EYE CATARACT  Diagnosis Additional Information: No value filed.    Anesthesia Type:   MAC     Note:  Airway :Room Air  Patient transferred to:PACU  Comments: Pt awake and able to verbalize needs. VSS. All monitors on and audible. Spontaneous respiration without difficulty. o2 sats 99% on RA. Pt denies pain. Report given to PACU RN.      Vitals: (Last set prior to Anesthesia Care Transfer)    CRNA VITALS  7/18/2017 0958 - 7/18/2017 1031      7/18/2017             Ht Rate: (!)  49    Resp Rate (set): 10                Electronically Signed By: VITOR Gamboa CRNA  July 18, 2017  10:31 AM  
no strength deficits were identified

## (undated) DEVICE — GLOVE PROTEXIS MICRO 7.5  2D73PM75

## (undated) DEVICE — EYE SOL BSS 500ML

## (undated) DEVICE — EYE KNIFE CRESCENT 55DEG 373807

## (undated) DEVICE — EYE MARKING PAD 581057

## (undated) DEVICE — EYE PACK BVI READYPAK KIT #2

## (undated) DEVICE — EYE SHIELD PLASTIC

## (undated) DEVICE — PACK CATARACT CUSTOM SO DALE SEY32CTFCX

## (undated) DEVICE — GLOVE PROTEXIS MICRO 7.0  2D73PM70

## (undated) DEVICE — EYE KNIFE SLIT XSTAR VISITEC 2.4MM 45DEG BEVEL UP 373724

## (undated) DEVICE — LINEN TOWEL PACK X5 5464

## (undated) DEVICE — APPLICATOR COTTON TIP 6"X2 STERILE LF 6012

## (undated) DEVICE — TAPE MICROPORE 2"X1.5YD 1530S-2

## (undated) DEVICE — EYE PACK CUSTOM ANTERIOR 30DEG TIP CENTURION PPK6682-04

## (undated) DEVICE — GLOVE PROTEXIS MICRO 8.5  2D73PM85

## (undated) RX ORDER — LIDOCAINE HYDROCHLORIDE 10 MG/ML
INJECTION, SOLUTION EPIDURAL; INFILTRATION; INTRACAUDAL; PERINEURAL
Status: DISPENSED
Start: 2017-08-15

## (undated) RX ORDER — ONDANSETRON 2 MG/ML
INJECTION INTRAMUSCULAR; INTRAVENOUS
Status: DISPENSED
Start: 2017-08-15